# Patient Record
Sex: FEMALE | Race: WHITE | NOT HISPANIC OR LATINO | URBAN - METROPOLITAN AREA
[De-identification: names, ages, dates, MRNs, and addresses within clinical notes are randomized per-mention and may not be internally consistent; named-entity substitution may affect disease eponyms.]

---

## 2023-12-06 ENCOUNTER — OFFICE VISIT (OUTPATIENT)
Dept: FAMILY MEDICINE | Facility: CLINIC | Age: 33
End: 2023-12-06
Payer: MEDICAID

## 2023-12-06 VITALS
DIASTOLIC BLOOD PRESSURE: 73 MMHG | TEMPERATURE: 97 F | HEART RATE: 73 BPM | SYSTOLIC BLOOD PRESSURE: 106 MMHG | RESPIRATION RATE: 20 BRPM | BODY MASS INDEX: 18.47 KG/M2 | HEIGHT: 64 IN | OXYGEN SATURATION: 97 % | WEIGHT: 108.2 LBS

## 2023-12-06 DIAGNOSIS — Z02.89 REFUGEE HEALTH EXAMINATION: Primary | ICD-10-CM

## 2023-12-06 LAB
ALBUMIN SERPL BCG-MCNC: 4.7 G/DL (ref 3.5–5.2)
ALP SERPL-CCNC: 71 U/L (ref 40–150)
ALT SERPL W P-5'-P-CCNC: 16 U/L (ref 0–50)
ANION GAP SERPL CALCULATED.3IONS-SCNC: 11 MMOL/L (ref 7–15)
AST SERPL W P-5'-P-CCNC: 22 U/L (ref 0–45)
BASOPHILS # BLD AUTO: 0.1 10E3/UL (ref 0–0.2)
BASOPHILS NFR BLD AUTO: 1 %
BILIRUB SERPL-MCNC: 0.5 MG/DL
BUN SERPL-MCNC: 15.3 MG/DL (ref 6–20)
CALCIUM SERPL-MCNC: 10 MG/DL (ref 8.6–10)
CHLORIDE SERPL-SCNC: 105 MMOL/L (ref 98–107)
CREAT SERPL-MCNC: 0.71 MG/DL (ref 0.51–0.95)
DEPRECATED HCO3 PLAS-SCNC: 25 MMOL/L (ref 22–29)
EGFRCR SERPLBLD CKD-EPI 2021: >90 ML/MIN/1.73M2
EOSINOPHIL # BLD AUTO: 0.1 10E3/UL (ref 0–0.7)
EOSINOPHIL NFR BLD AUTO: 2 %
ERYTHROCYTE [DISTWIDTH] IN BLOOD BY AUTOMATED COUNT: 14 % (ref 10–15)
GLUCOSE SERPL-MCNC: 86 MG/DL (ref 70–99)
HAV IGG SER QL IA: NONREACTIVE
HBV CORE AB SERPL QL IA: NONREACTIVE
HBV SURFACE AB SERPL IA-ACNC: 0.25 M[IU]/ML
HBV SURFACE AB SERPL IA-ACNC: NONREACTIVE M[IU]/ML
HBV SURFACE AG SERPL QL IA: NONREACTIVE
HCT VFR BLD AUTO: 41.3 % (ref 35–47)
HCV AB SERPL QL IA: NONREACTIVE
HGB BLD-MCNC: 13.1 G/DL (ref 11.7–15.7)
HIV 1+2 AB+HIV1 P24 AG SERPL QL IA: NONREACTIVE
IMM GRANULOCYTES # BLD: 0 10E3/UL
IMM GRANULOCYTES NFR BLD: 0 %
LYMPHOCYTES # BLD AUTO: 2.4 10E3/UL (ref 0.8–5.3)
LYMPHOCYTES NFR BLD AUTO: 48 %
MCH RBC QN AUTO: 28.2 PG (ref 26.5–33)
MCHC RBC AUTO-ENTMCNC: 31.7 G/DL (ref 31.5–36.5)
MCV RBC AUTO: 89 FL (ref 78–100)
MONOCYTES # BLD AUTO: 0.3 10E3/UL (ref 0–1.3)
MONOCYTES NFR BLD AUTO: 6 %
NEUTROPHILS # BLD AUTO: 2.1 10E3/UL (ref 1.6–8.3)
NEUTROPHILS NFR BLD AUTO: 43 %
NRBC # BLD AUTO: 0 10E3/UL
NRBC BLD AUTO-RTO: 0 /100
PLATELET # BLD AUTO: 235 10E3/UL (ref 150–450)
POTASSIUM SERPL-SCNC: 4.3 MMOL/L (ref 3.4–5.3)
PROT SERPL-MCNC: 7.7 G/DL (ref 6.4–8.3)
RBC # BLD AUTO: 4.64 10E6/UL (ref 3.8–5.2)
SODIUM SERPL-SCNC: 141 MMOL/L (ref 135–145)
T PALLIDUM AB SER QL: NONREACTIVE
WBC # BLD AUTO: 5 10E3/UL (ref 4–11)

## 2023-12-06 PROCEDURE — 86803 HEPATITIS C AB TEST: CPT

## 2023-12-06 PROCEDURE — 80053 COMPREHEN METABOLIC PANEL: CPT

## 2023-12-06 PROCEDURE — 86787 VARICELLA-ZOSTER ANTIBODY: CPT

## 2023-12-06 PROCEDURE — 87389 HIV-1 AG W/HIV-1&-2 AB AG IA: CPT

## 2023-12-06 PROCEDURE — 36415 COLL VENOUS BLD VENIPUNCTURE: CPT

## 2023-12-06 PROCEDURE — 87340 HEPATITIS B SURFACE AG IA: CPT

## 2023-12-06 PROCEDURE — 86780 TREPONEMA PALLIDUM: CPT

## 2023-12-06 PROCEDURE — 85025 COMPLETE CBC W/AUTO DIFF WBC: CPT

## 2023-12-06 PROCEDURE — 86481 TB AG RESPONSE T-CELL SUSP: CPT

## 2023-12-06 PROCEDURE — 99000 SPECIMEN HANDLING OFFICE-LAB: CPT

## 2023-12-06 PROCEDURE — 86708 HEPATITIS A ANTIBODY: CPT

## 2023-12-06 PROCEDURE — 99385 PREV VISIT NEW AGE 18-39: CPT | Mod: GC

## 2023-12-06 PROCEDURE — 86682 HELMINTH ANTIBODY: CPT | Mod: 90

## 2023-12-06 PROCEDURE — 86706 HEP B SURFACE ANTIBODY: CPT

## 2023-12-06 PROCEDURE — 86704 HEP B CORE ANTIBODY TOTAL: CPT

## 2023-12-06 RX ORDER — PRENATAL VIT/IRON FUM/FOLIC AC 27MG-0.8MG
1 TABLET ORAL DAILY
Qty: 90 TABLET | Refills: 3 | Status: SHIPPED | OUTPATIENT
Start: 2023-12-06 | End: 2023-12-28

## 2023-12-06 NOTE — PROGRESS NOTES
Preceptor Attestation:    I discussed the patient with the resident and evaluated the patient in person. I have verified the content of the note, which accurately reflects my assessment of the patient and the plan of care.   Supervising Physician:  Genevieve Benitez MD

## 2023-12-06 NOTE — LETTER
December 20, 2023      Lisa Cobb  [address]  [place] [address]        Dear ,    We are writing to inform you of your test results.    Adore Gonzalez,     I hope you're well. I wanted to communicate with you the results of the tests that we did.     The laboratory results show you tested negative for parasites. Schistosoma was positive so we will need to repeat that test as we discussed in clinic. Please let me know if you have any questions or concerns.     Thank you.     Trudi Christopher,  PGY3     Resulted Orders   Routine parasitology exam   Result Value Ref Range    OVA AND PARASITE EXAM Negative Negative      Comment:      A single negative specimen does not rule out parasitic infection.    Narrative    Cryptosporidium, Cyclospora and Microsporidia are not readily detected by this method.   Hepatitis B Surface Ag   Result Value Ref Range    Hepatitis B Surface Antigen Nonreactive Nonreactive   Yoana Zoster Imm Status Елена   Result Value Ref Range    VZV Елена IgG Instrument Value 40.2 <135.0 Index    Varicella Zoster Antibody IgG No detectable antibody.    Hepatitis C Antibody   Result Value Ref Range    Hepatitis C Antibody Nonreactive Nonreactive    Narrative    Assay performance characteristics have not been established for newborns, infants, and children.   Hepatitis B Surface Ab   Result Value Ref Range    Hepatitis B Surface Antibody Instrument Value 0.25 <8.00 m[IU]/mL    Hepatitis B Surface Antibody Nonreactive       Comment:      No antibody detected when the value is less than 8.00 mIU/mL.   Hepatitis B Core Ab   Result Value Ref Range    Hepatitis B Core Antibody Total Nonreactive Nonreactive   Hepatitis A Immune Status   Result Value Ref Range    Hepatitis A Antibody IgG Nonreactive Nonreactive    Narrative    This assay cannot be used for the diagnosis of acute HAV infection.   HIV Ag/Ab Screen Cascade   Result Value Ref Range    HIV Antigen Antibody Combo Nonreactive Nonreactive       Comment:      HIV-1 p24 Ag & HIV-1/HIV-2 Ab Not Detected   Schistosoma Елена   Result Value Ref Range    Schistosoma Antibody IgG 10 (H) <=8 U      Comment:        Equivocal - Recommend repeat testing in 2-4 weeks with   fresh sample.  Performed By: [facility]  [address] [place], [place] [id number]  : Zechariah Cevallos MD, PhD  CLIA Number: [id number]   Strongyloides Ab,IgG (STRNG)   Result Value Ref Range    Strongyloides Елена IgG 0.6 <=0.9 IV      Comment:      INTERPRETIVE INFORMATION: Strongyloides Ab, IgG by ALEXI      0.9 IV or less....... Negative - No significant                          level of Strongyloides IgG                          antibody detected.       1.0 IV................Equivocal - The Strongyloides IgG                           antibody result is borderline and                           therefore inconclusive. Recommend                           retesting the patient in 2-4 weeks,                          if clinically indicated.      1.1 IV or greater ... Positive - IgG antibodies to                          Strongyloides detected, which                          may suggest current or past                          infection.    False-positive results may occur with prior exposure to   other helminth infections. Testing low-prevalence   populations may also result in false-positive results.  Performed By: [facility]  [address] [place] [id number]  : Zechariah Cevallos MD, PhD  CLIA  Number: [id number]   Syphilis Screen Cascade   Result Value Ref Range    Treponema Antibody Total Nonreactive Nonreactive   Comprehensive Metabolic   Result Value Ref Range    Sodium 141 135 - 145 mmol/L      Comment:      Reference intervals for this test were updated on 09/26/2023 to more accurately reflect our healthy population. There may be differences in the flagging of prior results with similar values performed with this method.  Interpretation of those prior results can be made in the context of the updated reference intervals.     Potassium 4.3 3.4 - 5.3 mmol/L    Carbon Dioxide (CO2) 25 22 - 29 mmol/L    Anion Gap 11 7 - 15 mmol/L    Urea Nitrogen 15.3 6.0 - 20.0 mg/dL    Creatinine 0.71 0.51 - 0.95 mg/dL    GFR Estimate >90 >60 mL/min/1.73m2    Calcium 10.0 8.6 - 10.0 mg/dL    Chloride 105 98 - 107 mmol/L    Glucose 86 70 - 99 mg/dL    Alkaline Phosphatase 71 40 - 150 U/L      Comment:      Reference intervals for this test were updated on 11/14/2023 to more accurately reflect our healthy population. There may be differences in the flagging of prior results with similar values performed with this method. Interpretation of those prior results can be made in the context of the updated reference intervals.    AST 22 0 - 45 U/L      Comment:      Reference intervals for this test were updated on 6/12/2023 to more accurately reflect our healthy population. There may be differences in the flagging of prior results with similar values performed with this method. Interpretation of those prior results can be made in the context of the updated reference intervals.    ALT 16 0 - 50 U/L      Comment:      Reference intervals for this test were updated on 6/12/2023 to more accurately reflect our healthy population. There may be differences in the flagging of prior results with similar values performed with this method. Interpretation of those prior results can be made in the context of the updated reference intervals.      Protein Total 7.7 6.4 - 8.3 g/dL    Albumin 4.7 3.5 - 5.2 g/dL    Bilirubin Total 0.5 <=1.2 mg/dL   CBC with platelets and differential   Result Value Ref Range    WBC Count 5.0 4.0 - 11.0 10e3/uL    RBC Count 4.64 3.80 - 5.20 10e6/uL    Hemoglobin 13.1 11.7 - 15.7 g/dL    Hematocrit 41.3 35.0 - 47.0 %    MCV 89 78 - 100 fL    MCH 28.2 26.5 - 33.0 pg    MCHC 31.7 31.5 - 36.5 g/dL    RDW 14.0 10.0 - 15.0 %    Platelet Count 235 150  - 450 10e3/uL    % Neutrophils 43 %    % Lymphocytes 48 %    % Monocytes 6 %    % Eosinophils 2 %    % Basophils 1 %    % Immature Granulocytes 0 %    NRBCs per 100 WBC 0 <1 /100    Absolute Neutrophils 2.1 1.6 - 8.3 10e3/uL    Absolute Lymphocytes 2.4 0.8 - 5.3 10e3/uL    Absolute Monocytes 0.3 0.0 - 1.3 10e3/uL    Absolute Eosinophils 0.1 0.0 - 0.7 10e3/uL    Absolute Basophils 0.1 0.0 - 0.2 10e3/uL    Absolute Immature Granulocytes 0.0 <=0.4 10e3/uL    Absolute NRBCs 0.0 10e3/uL   Quantiferon TB Gold Plus Grey Tube   Result Value Ref Range    Quantiferon Nil Tube 0.05 IU/mL   Quantiferon TB Gold Plus Green Tube   Result Value Ref Range    Quantiferon TB1 Tube 0.07 IU/mL   Quantiferon TB Gold Plus Yellow Tube   Result Value Ref Range    Quantiferon TB2 Tube 0.05    Quantiferon TB Gold Plus Purple Tube   Result Value Ref Range    Quantiferon Mitogen 10.00 IU/mL   Quantiferon TB Gold Plus   Result Value Ref Range    Quantiferon-TB Gold Plus Negative Negative      Comment:      No interferon gamma response to M.tuberculosis antigens was detected. Infection with M.tuberculosis is unlikely, however a single negative result does not exclude infection. In patients at high risk for infection, a second test should be considered in accordance with the 2017 ATS/IDSA/CDC Clinical Pract  ice Guidelines for Diagnosis of Tuberculosis in Adults and Children     TB1 Ag minus Nil Value 0.02 IU/mL    TB2 Ag minus Nil Value 0.00 IU/mL    Mitogen minus Nil Result 9.95 IU/mL    Nil Result 0.05 IU/mL       If you have any questions or concerns, please call the clinic at the number listed above.       Sincerely,      Genevieve Benitez MD

## 2023-12-06 NOTE — PROGRESS NOTES
Initial Refugee Screening Exam    PC staff should enter immunizations into the chart no records, unable to access [facility]     used this visit: A Danish  was used for this visit.     HEALTH HISTORY    Concerns today: no    [place]  [facility]   Year left country of Origin: 2023   Other countries lived in and dates: in [place] 2 months prior to coming to [place]  Date of Arrival in [place]: sept 29, 2023   Is our listed age correct? Yes  Native Language: Danish or Yemeni   Family in [place]: No    Pre-Departure Medical Screening Examination Reviewed:No  Class A conditions: No  Class B conditions: No  Presumptive treatment for intestinal parasites?: No  History of BCG vaccination: Yes  Chronic or serious illness: No  Hospitalizations: No  Trauma:No     Family history, medication list, problem list and allergies were reviewed and updated as needed in Epic.    ROS:    C: NEGATIVE for fever, chills, change in weight  I: NEGATIVE for worrisome rashes, moles or lesions, spots without sensations (e.g. leprosy)  E: NEGATIVE for vision changes or irritation or red eyes  E/M: NEGATIVE for ear, mouth and throat problems  R: NEGATIVE for significant cough or SOB  CV: NEGATIVE for chest pain, palpitations or peripheral edema  GI: NEGATIVE for nausea, abdominal pain, heartburn, or change in bowel habits  : NEGATIVE for frequency, dysuria, or hematuria  M: NEGATIVE for significant arthralgias or myalgia  N: NEGATIVE for weakness, dizziness or paresthesias, headaches  E: NEGATIVE for temperature intolerance, skin/hair changes  H: NEGATIVE for bleeding problems  P: NEGATIVE for nightmares, no sleep problems, not easily saddened or angered    Mental Health:    1. In the past month, have you felt too sad? No  2. In the past month, have you been worrying or thinking too much? Yes - worries about kid going to school   3. In the past month, have thoughts about the past that kept you from doing things or  spending time  with others? No  4. In the past month, did you have sleep problems? No  5. In the past month, did you have memory problems? No    If any of the above answers were yes, then ask:  6. Did any of the above stop you from doing things you need to do every day? No    Offered behavioral health referral: Yes, declines today       EXAMINATION:  There were no vitals taken for this visit.  GENERAL: healthy, alert, well nourished, well hydrated, no distress  HENT: ear canals- normal; TMs- normal; Nose- normal; Mouth- no ulcers, no lesions  NECK: no tenderness, no adenopathy, no asymmetry, no masses, no stiffness; thyroid- normal to palpation  RESP: lungs clear to auscultation - no rales, no rhonchi, no wheezes  CV: regular rates and rhythm, normal S1 S2, no S3 or S4 and no murmur, no click or rub -  ABDOMEN: soft, no tenderness, no  hepatosplenomegaly, no masses, normal bowel sounds    ASSESSMENT:    New Arrival Health Screening     PLAN:    1) Labs:  O&P  CMP  Lipid if age >18  CBC with diff  B12 if from [facility] or clinical suspicion  TB Quant if age 2 or older  RPR  Strongyloides Ab  Schistosoma Ab  HIV  Heb B Core Ab  Hep B Surface Ab  Hep B Surface Ag  Hep C Ab  Hep A Ab  O & P, direct smears x 2 concentration and ID  Varicella titer    2) TB:  TB Quant    3) Immunizations to be applied at second visit.    4) preventative   -prenatal vitamins         RTC in 2-4 weeks for discussion of results, treatment (if necessary) and  devlopment of an ongoing plan for care    Trudi Christopher DO, PGY-3  [facility]    Today I precepted with Dr. Emmanuel MD, who agrees with the assessment and plan.

## 2023-12-06 NOTE — PATIENT INSTRUCTIONS
Thank you for trusting us with your care.     Here's a summary of the visit today:   You will have labs drawn   Prenatal vitamins sent to pharmacy   Follow up on 12/13 to discuss results            Thank you.     Dr. Christopher

## 2023-12-07 LAB
VZV IGG SER QL IA: 40.2 INDEX
VZV IGG SER QL IA: NORMAL

## 2023-12-08 LAB
GAMMA INTERFERON BACKGROUND BLD IA-ACNC: 0.05 IU/ML
M TB IFN-G BLD-IMP: NEGATIVE
M TB IFN-G CD4+ BCKGRND COR BLD-ACNC: 9.95 IU/ML
MITOGEN IGNF BCKGRD COR BLD-ACNC: 0 IU/ML
MITOGEN IGNF BCKGRD COR BLD-ACNC: 0.02 IU/ML
QUANTIFERON MITOGEN: 10 IU/ML
QUANTIFERON NIL TUBE: 0.05 IU/ML
QUANTIFERON TB1 TUBE: 0.07 IU/ML
QUANTIFERON TB2 TUBE: 0.05

## 2023-12-09 LAB — SCHISTOSOMA IGG SER IA-ACNC: 10 U

## 2023-12-10 LAB — STRONGYLOIDES IGG SER IA-ACNC: 0.6 IV

## 2023-12-12 ENCOUNTER — CARE COORDINATION (OUTPATIENT)
Dept: FAMILY MEDICINE | Facility: CLINIC | Age: 33
End: 2023-12-12
Payer: MEDICAID

## 2023-12-12 PROCEDURE — 87209 SMEAR COMPLEX STAIN: CPT

## 2023-12-12 PROCEDURE — 87177 OVA AND PARASITES SMEARS: CPT

## 2023-12-12 NOTE — PROGRESS NOTES
Medical Transportation Coordination    Appt Date: 12/13/2023               Arrival Time: 1:10pm    Appt Location & Address:   [address] [place] [address]    Total family members:   4    Tranportation Name & Phone Number:   Margie [phone]    Estimated Pick-up Time:   12:00-12:15pm    Roundtrip?  Yes  Patient Notified?  No - will call w/ today to confirm    Ailyn Arana on 12/12/2023 at 8:58 AM

## 2023-12-13 ENCOUNTER — OFFICE VISIT (OUTPATIENT)
Dept: FAMILY MEDICINE | Facility: CLINIC | Age: 33
End: 2023-12-13
Payer: MEDICAID

## 2023-12-13 VITALS
OXYGEN SATURATION: 98 % | DIASTOLIC BLOOD PRESSURE: 65 MMHG | RESPIRATION RATE: 16 BRPM | HEART RATE: 65 BPM | SYSTOLIC BLOOD PRESSURE: 100 MMHG | TEMPERATURE: 97.6 F

## 2023-12-13 DIAGNOSIS — Z02.89 REFUGEE HEALTH EXAMINATION: Primary | ICD-10-CM

## 2023-12-13 PROCEDURE — 99214 OFFICE O/P EST MOD 30 MIN: CPT | Mod: GC

## 2023-12-13 NOTE — PATIENT INSTRUCTIONS
Health Maintenance Due   Topic Date Due   • Shingles Vaccine (2 of 2) 11/05/2018   • Colonoscopy Risk  12/07/2020   • COVID-19 Vaccine (3 - Moderna risk 3-dose series) 05/28/2021       Patient is due for topics as listed above but is not proceeding with Immunization(s) COVID-19 and Colorectal Cancer Screening: Colonoscopy at this time. Patient is following pediatric unified immunization schedule for immunizations.Patient completed Second Shingrix Vaccine will update once confirmed.           Thank you for trusting us with your care.     Here's a summary of the visit today:   You received extensive family planning counseling, pamphlet for contraceptive methods    Recheck schistosoma blood test at next visit   Follow up in 4-6 weeks for annual female exam            Thank you.     Dr. Christopher

## 2023-12-13 NOTE — PROGRESS NOTES
REFUGEE SCREENING: SECOND VISIT    Subjective: no concerns     Labs from Initial Refugee Screening Visit were reviewed       Office Visit on 12/06/2023   Component Date Value Ref Range Status    Hepatitis B Surface Antigen 12/06/2023 Nonreactive  Nonreactive Final    VZV Елена IgG Instrument Value 12/06/2023 40.2  <135.0 Index Final    Varicella Zoster Antibody IgG 12/06/2023 No detectable antibody.   Final    Hepatitis C Antibody 12/06/2023 Nonreactive  Nonreactive Final    Hepatitis B Surface Antibody Instr* 12/06/2023 0.25  <8.00 m[IU]/mL Final    Hepatitis B Surface Antibody 12/06/2023 Nonreactive   Final    No antibody detected when the value is less than 8.00 mIU/mL.    Hepatitis B Core Antibody Total 12/06/2023 Nonreactive  Nonreactive Final    Hepatitis A Antibody IgG 12/06/2023 Nonreactive  Nonreactive Final    HIV Antigen Antibody Combo 12/06/2023 Nonreactive  Nonreactive Final    HIV-1 p24 Ag & HIV-1/HIV-2 Ab Not Detected    Schistosoma Antibody IgG 12/06/2023 10 (H)  <=8 U Final      Equivocal - Recommend repeat testing in 2-4 weeks with   fresh sample.  Performed By: [facility]  [address] [place] [id number]  : Zechariah Cevallos MD, PhD  CLIA Number: [id number]    Strongyloides Елена IgG 12/06/2023 0.6  <=0.9 IV Final    Comment: INTERPRETIVE INFORMATION: Strongyloides Ab, IgG by ALEXI      0.9 IV or less....... Negative - No significant                          level of Strongyloides IgG                          antibody detected.       1.0 IV................Equivocal - The Strongyloides IgG                           antibody result is borderline and                           therefore inconclusive. Recommend                           retesting the patient in 2-4 weeks,                          if clinically indicated.      1.1 IV or greater ... Positive - IgG antibodies to                          Strongyloides detected, which                          may suggest  current or past                          infection.    False-positive results may occur with prior exposure to   other helminth infections. Testing low-prevalence   populations may also result in false-positive results.  Performed By: [facility]  [address] [place] [id number]  : Zechariah Cevallos MD, PhD  CLIA                            Number: [id number]    Treponema Antibody Total 12/06/2023 Nonreactive  Nonreactive Final    Sodium 12/06/2023 141  135 - 145 mmol/L Final    Reference intervals for this test were updated on 09/26/2023 to more accurately reflect our healthy population. There may be differences in the flagging of prior results with similar values performed with this method. Interpretation of those prior results can be made in the context of the updated reference intervals.     Potassium 12/06/2023 4.3  3.4 - 5.3 mmol/L Final    Carbon Dioxide (CO2) 12/06/2023 25  22 - 29 mmol/L Final    Anion Gap 12/06/2023 11  7 - 15 mmol/L Final    Urea Nitrogen 12/06/2023 15.3  6.0 - 20.0 mg/dL Final    Creatinine 12/06/2023 0.71  0.51 - 0.95 mg/dL Final    GFR Estimate 12/06/2023 >90  >60 mL/min/1.73m2 Final    Calcium 12/06/2023 10.0  8.6 - 10.0 mg/dL Final    Chloride 12/06/2023 105  98 - 107 mmol/L Final    Glucose 12/06/2023 86  70 - 99 mg/dL Final    Alkaline Phosphatase 12/06/2023 71  40 - 150 U/L Final    Reference intervals for this test were updated on 11/14/2023 to more accurately reflect our healthy population. There may be differences in the flagging of prior results with similar values performed with this method. Interpretation of those prior results can be made in the context of the updated reference intervals.    AST 12/06/2023 22  0 - 45 U/L Final    Reference intervals for this test were updated on 6/12/2023 to more accurately reflect our healthy population. There may be differences in the flagging of prior results with similar values performed with this  method. Interpretation of those prior results can be made in the context of the updated reference intervals.    ALT 12/06/2023 16  0 - 50 U/L Final    Reference intervals for this test were updated on 6/12/2023 to more accurately reflect our healthy population. There may be differences in the flagging of prior results with similar values performed with this method. Interpretation of those prior results can be made in the context of the updated reference intervals.      Protein Total 12/06/2023 7.7  6.4 - 8.3 g/dL Final    Albumin 12/06/2023 4.7  3.5 - 5.2 g/dL Final    Bilirubin Total 12/06/2023 0.5  <=1.2 mg/dL Final    WBC Count 12/06/2023 5.0  4.0 - 11.0 10e3/uL Final    RBC Count 12/06/2023 4.64  3.80 - 5.20 10e6/uL Final    Hemoglobin 12/06/2023 13.1  11.7 - 15.7 g/dL Final    Hematocrit 12/06/2023 41.3  35.0 - 47.0 % Final    MCV 12/06/2023 89  78 - 100 fL Final    MCH 12/06/2023 28.2  26.5 - 33.0 pg Final    MCHC 12/06/2023 31.7  31.5 - 36.5 g/dL Final    RDW 12/06/2023 14.0  10.0 - 15.0 % Final    Platelet Count 12/06/2023 235  150 - 450 10e3/uL Final    % Neutrophils 12/06/2023 43  % Final    % Lymphocytes 12/06/2023 48  % Final    % Monocytes 12/06/2023 6  % Final    % Eosinophils 12/06/2023 2  % Final    % Basophils 12/06/2023 1  % Final    % Immature Granulocytes 12/06/2023 0  % Final    NRBCs per 100 WBC 12/06/2023 0  <1 /100 Final    Absolute Neutrophils 12/06/2023 2.1  1.6 - 8.3 10e3/uL Final    Absolute Lymphocytes 12/06/2023 2.4  0.8 - 5.3 10e3/uL Final    Absolute Monocytes 12/06/2023 0.3  0.0 - 1.3 10e3/uL Final    Absolute Eosinophils 12/06/2023 0.1  0.0 - 0.7 10e3/uL Final    Absolute Basophils 12/06/2023 0.1  0.0 - 0.2 10e3/uL Final    Absolute Immature Granulocytes 12/06/2023 0.0  <=0.4 10e3/uL Final    Absolute NRBCs 12/06/2023 0.0  10e3/uL Final    Quantiferon Nil Tube 12/06/2023 0.05  IU/mL Final    Quantiferon TB1 Tube 12/06/2023 0.07  IU/mL Final    Quantiferon TB2 Tube 12/06/2023 0.05    Final    Quantiferon Mitogen 12/06/2023 10.00  IU/mL Final    Quantiferon-TB Gold Plus 12/06/2023 Negative  Negative Final    No interferon gamma response to M.tuberculosis antigens was detected. Infection with M.tuberculosis is unlikely, however a single negative result does not exclude infection. In patients at high risk for infection, a second test should be considered in accordance with the 2017 ATS/IDSA/CDC Clinical Pract  ice Guidelines for Diagnosis of Tuberculosis in Adults and Children     TB1 Ag minus Nil Value 12/06/2023 0.02  IU/mL Final    TB2 Ag minus Nil Value 12/06/2023 0.00  IU/mL Final    Mitogen minus Nil Result 12/06/2023 9.95  IU/mL Final    Nil Result 12/06/2023 0.05  IU/mL Final        ROS:  General: No fevers, sleeping well at night  Head: No headache  Neck: No swallowing problems   CV: No chest pain or palpitations  Resp: No shortness of breath.  No cough.  GI: No constipation, or diarrhea, no nausea or vomiting  : No urinary c/o    Objective:  /65   Pulse 65   Temp 97.6  F (36.4  C)   Resp 16   SpO2 98%   Gen:  Well nourished and in NAD  HEENT: nasopharynx pink and moist; oropharynx pink and moist  Neck: supple without lymphadenopathy  CV:  RRR  - no murmurs, rubs, or gallups,   Pulm:  CTAB, no wheezes/rales/rhonchi, good air entry   ABD: soft, nontender  Extrem: no cyanosis, edema or clubbing;   Psych: Euthymic     Assessment/Plan:  There are no diagnoses linked to this encounter.    1)Abnormal lab results: schistosoma Abx , repeat in 2-4 weeks    2) Abnormal parasite results and treatment plant: schistosoma Abx , repeat in 2-4 weeks    3) TB: TB Quant result: neg     4) Immunizations:  TDaP  MMR  Hep B  Hep A  Influenza   Yearly  Varicella   Provided vaccine counseling, risks and benefits. Patient declines at this time.       Discussed contraception given   5)Referrals: No   Discussed contraception, various methods, provided patient education pamphlet. Patient is exclusively  breastfeeding, not using barrier methods to prevent pregnancy.   Discussed the risk of uterine rupture with subsequent pregnancy given history of C/S x 2 with most recent one resulting in uterine rupture. Patient given contraception pamphlet to review.   Consider referral to OBGYN, follow up hematoma imaging at next female physical.         6)Follow up plan: Return to clinic for women's health exam in 2-4 weeks.     We discussed having a visit with a dentist to establish regular dental care: resources provided   We discussed yearly visits with a primary care physician for preventative health care: yes      Trudi Christopher DO, PGY-3  [facility]    Today I precepted with Dr. Jan MD, who agrees with the assessment and plan.

## 2023-12-13 NOTE — PROGRESS NOTES
Preceptor Attestation:    I discussed the patient with the resident and evaluated the patient in person. I have verified the content of the note, which accurately reflects my assessment of the patient and the plan of care.   Supervising Physician:  Alexei Montoya DO.

## 2023-12-14 LAB — O+P STL MICRO: NEGATIVE

## 2023-12-28 DIAGNOSIS — Z02.89 REFUGEE HEALTH EXAMINATION: ICD-10-CM

## 2023-12-28 RX ORDER — PRENATAL VIT/IRON FUM/FOLIC AC 27MG-0.8MG
1 TABLET ORAL DAILY
Qty: 90 TABLET | Refills: 3 | Status: SHIPPED | OUTPATIENT
Start: 2023-12-28

## 2024-04-09 ENCOUNTER — OFFICE VISIT (OUTPATIENT)
Dept: URGENT CARE | Facility: URGENT CARE | Age: 34
End: 2024-04-09
Payer: COMMERCIAL

## 2024-04-09 VITALS
WEIGHT: 106 LBS | HEIGHT: 64 IN | TEMPERATURE: 98.3 F | DIASTOLIC BLOOD PRESSURE: 71 MMHG | BODY MASS INDEX: 18.1 KG/M2 | OXYGEN SATURATION: 100 % | SYSTOLIC BLOOD PRESSURE: 110 MMHG | HEART RATE: 80 BPM

## 2024-04-09 DIAGNOSIS — J34.89 SINUS DRAINAGE: Primary | ICD-10-CM

## 2024-04-09 DIAGNOSIS — R51.9 FRONTAL HEADACHE: ICD-10-CM

## 2024-04-09 LAB
BASOPHILS # BLD AUTO: 0 10E3/UL (ref 0–0.2)
BASOPHILS NFR BLD AUTO: 1 %
EOSINOPHIL # BLD AUTO: 0 10E3/UL (ref 0–0.7)
EOSINOPHIL NFR BLD AUTO: 1 %
ERYTHROCYTE [DISTWIDTH] IN BLOOD BY AUTOMATED COUNT: 14.1 % (ref 10–15)
HCT VFR BLD AUTO: 38.2 % (ref 35–47)
HGB BLD-MCNC: 12.3 G/DL (ref 11.7–15.7)
IMM GRANULOCYTES # BLD: 0 10E3/UL
IMM GRANULOCYTES NFR BLD: 0 %
LYMPHOCYTES # BLD AUTO: 1.9 10E3/UL (ref 0.8–5.3)
LYMPHOCYTES NFR BLD AUTO: 38 %
MCH RBC QN AUTO: 27.2 PG (ref 26.5–33)
MCHC RBC AUTO-ENTMCNC: 32.2 G/DL (ref 31.5–36.5)
MCV RBC AUTO: 85 FL (ref 78–100)
MONOCYTES # BLD AUTO: 0.2 10E3/UL (ref 0–1.3)
MONOCYTES NFR BLD AUTO: 4 %
NEUTROPHILS # BLD AUTO: 2.9 10E3/UL (ref 1.6–8.3)
NEUTROPHILS NFR BLD AUTO: 56 %
PLATELET # BLD AUTO: 337 10E3/UL (ref 150–450)
RBC # BLD AUTO: 4.52 10E6/UL (ref 3.8–5.2)
WBC # BLD AUTO: 5.1 10E3/UL (ref 4–11)

## 2024-04-09 PROCEDURE — 99204 OFFICE O/P NEW MOD 45 MIN: CPT | Performed by: FAMILY MEDICINE

## 2024-04-09 PROCEDURE — 36415 COLL VENOUS BLD VENIPUNCTURE: CPT | Performed by: FAMILY MEDICINE

## 2024-04-09 PROCEDURE — 85025 COMPLETE CBC W/AUTO DIFF WBC: CPT | Performed by: FAMILY MEDICINE

## 2024-04-09 NOTE — PATIENT INSTRUCTIONS
Saline nasal spray   Continue on antihistamines such as zyrtec or claritin   Tylenol or ibuprofen for pain   Follow up if  symptoms fail to improve or worsens   Pt understood and agreed with plan

## 2024-04-09 NOTE — PROGRESS NOTES
Chief Complaint   Patient presents with    Nasal Congestion     X2 weeks of congestion  Yellow drainage from nose, unlike mucus coming out of nose before     Headache     X2 weeks of headache     Urgent Care         Lisa was seen today for nasal congestion, headache and urgent care.    Diagnoses and all orders for this visit:    Sinus drainage  -     CBC with platelets and differential; Future  -     CBC with platelets and differential    Frontal headache      Results for orders placed or performed in visit on 04/09/24   CBC with platelets and differential     Status: None   Result Value Ref Range    WBC Count 5.1 4.0 - 11.0 10e3/uL    RBC Count 4.52 3.80 - 5.20 10e6/uL    Hemoglobin 12.3 11.7 - 15.7 g/dL    Hematocrit 38.2 35.0 - 47.0 %    MCV 85 78 - 100 fL    MCH 27.2 26.5 - 33.0 pg    MCHC 32.2 31.5 - 36.5 g/dL    RDW 14.1 10.0 - 15.0 %    Platelet Count 337 150 - 450 10e3/uL    % Neutrophils 56 %    % Lymphocytes 38 %    % Monocytes 4 %    % Eosinophils 1 %    % Basophils 1 %    % Immature Granulocytes 0 %    Absolute Neutrophils 2.9 1.6 - 8.3 10e3/uL    Absolute Lymphocytes 1.9 0.8 - 5.3 10e3/uL    Absolute Monocytes 0.2 0.0 - 1.3 10e3/uL    Absolute Eosinophils 0.0 0.0 - 0.7 10e3/uL    Absolute Basophils 0.0 0.0 - 0.2 10e3/uL    Absolute Immature Granulocytes 0.0 <=0.4 10e3/uL   CBC with platelets and differential     Status: None    Narrative    The following orders were created for panel order CBC with platelets and differential.  Procedure                               Abnormality         Status                     ---------                               -----------         ------                     CBC with platelets and d...[[id number]]                      Final result                 Please view results for these tests on the individual orders.         D/d  Bronchitis-viral, Laryngitis, Pneumonia, Sinusitis, Tonsilitis, Viral pharyngitis, Viral syndrome, Viral upper respiratory illness, CSF leak      PLAN:  Symptomatic therapy suggested: push fluids, rest, gargle warm salt water, use vaporizer or mist needed , use acetaminophen, ibuprofen as needed, and apply heat to sinuses as needed. Call or return to clinic prn if these symptoms worsen or fail to improve as anticipated.  As exam was completely normal discussed with patient about the possibilities of the symptoms.  As she was feeling better on the yellow drainage just happen for a day and she is overall feeling better there is no need for an antibiotic at this point.  CBC was completely normal discussed with patient as there is no fever no chills no worsening headache would continue doing symptomatic treatment.  Spent more than 35 minutes discussing with patient and with family member with the help of an  explaining the condition and that there is no need for an antibiotic also reviewed with patient if there is any worsening symptoms with worsening drainage with high fever should follow-up.    SUBJECTIVE:   Lisa Cobb is a 33 year old female who complains of recent uri for 14 days. She denies a history of nasal congestion and also sore throat and  and denies a history of asthma..she denies any acute sob or cough but been noticing some frontal headache which she describes as being there on and off.  Also noticed some yellow-colored thin drainage from her nose which happened yesterday which was not mucous the patient is concerned about the drainage.  She has been feeling way better from the URI symptoms now worse daughter was also sick but now recovered.  Needed Israeli  during the entire visit.     OBJECTIVE:  She appears well, vital signs are as noted by the nurse. Ears normal.  Throat and pharynx normal.  Neck supple. No adenopathy in the neck. Nose is congested. Sinuses non tender. The chest is clear, without wheezes or rales.    Ana M Brito MD

## 2024-07-31 ENCOUNTER — OFFICE VISIT (OUTPATIENT)
Dept: FAMILY MEDICINE | Facility: CLINIC | Age: 34
End: 2024-07-31
Payer: COMMERCIAL

## 2024-07-31 VITALS
HEART RATE: 66 BPM | HEIGHT: 63 IN | OXYGEN SATURATION: 98 % | TEMPERATURE: 97.9 F | DIASTOLIC BLOOD PRESSURE: 65 MMHG | WEIGHT: 102.6 LBS | BODY MASS INDEX: 18.18 KG/M2 | SYSTOLIC BLOOD PRESSURE: 99 MMHG | RESPIRATION RATE: 18 BRPM

## 2024-07-31 DIAGNOSIS — Z11.8 SCREENING EXAMINATION FOR PARASITIC INFECTION: ICD-10-CM

## 2024-07-31 DIAGNOSIS — G89.29 CHRONIC UPPER BACK PAIN: ICD-10-CM

## 2024-07-31 DIAGNOSIS — Z13.1 SCREENING FOR DIABETES MELLITUS: ICD-10-CM

## 2024-07-31 DIAGNOSIS — Z13.29 SCREENING FOR THYROID DISORDER: ICD-10-CM

## 2024-07-31 DIAGNOSIS — Z00.00 ROUTINE GENERAL MEDICAL EXAMINATION AT A HEALTH CARE FACILITY: Primary | ICD-10-CM

## 2024-07-31 DIAGNOSIS — R23.3 PETECHIAE: ICD-10-CM

## 2024-07-31 DIAGNOSIS — Z12.4 CERVICAL CANCER SCREENING: ICD-10-CM

## 2024-07-31 DIAGNOSIS — M54.9 CHRONIC UPPER BACK PAIN: ICD-10-CM

## 2024-07-31 DIAGNOSIS — E55.9 VITAMIN D DEFICIENCY: ICD-10-CM

## 2024-07-31 DIAGNOSIS — Z13.220 LIPID SCREENING: ICD-10-CM

## 2024-07-31 LAB — ERYTHROCYTE [SEDIMENTATION RATE] IN BLOOD BY WESTERGREN METHOD: 7 MM/HR (ref 0–20)

## 2024-07-31 PROCEDURE — 80061 LIPID PANEL: CPT | Performed by: FAMILY MEDICINE

## 2024-07-31 PROCEDURE — 99214 OFFICE O/P EST MOD 30 MIN: CPT | Mod: 25 | Performed by: FAMILY MEDICINE

## 2024-07-31 PROCEDURE — 99000 SPECIMEN HANDLING OFFICE-LAB: CPT | Performed by: FAMILY MEDICINE

## 2024-07-31 PROCEDURE — 99395 PREV VISIT EST AGE 18-39: CPT | Performed by: FAMILY MEDICINE

## 2024-07-31 PROCEDURE — 86038 ANTINUCLEAR ANTIBODIES: CPT | Performed by: FAMILY MEDICINE

## 2024-07-31 PROCEDURE — 86036 ANCA SCREEN EACH ANTIBODY: CPT | Performed by: FAMILY MEDICINE

## 2024-07-31 PROCEDURE — 85652 RBC SED RATE AUTOMATED: CPT | Performed by: FAMILY MEDICINE

## 2024-07-31 PROCEDURE — 80053 COMPREHEN METABOLIC PANEL: CPT | Performed by: FAMILY MEDICINE

## 2024-07-31 PROCEDURE — 82306 VITAMIN D 25 HYDROXY: CPT | Performed by: FAMILY MEDICINE

## 2024-07-31 PROCEDURE — 36415 COLL VENOUS BLD VENIPUNCTURE: CPT | Performed by: FAMILY MEDICINE

## 2024-07-31 PROCEDURE — 84443 ASSAY THYROID STIM HORMONE: CPT | Performed by: FAMILY MEDICINE

## 2024-07-31 PROCEDURE — 86682 HELMINTH ANTIBODY: CPT | Mod: 90 | Performed by: FAMILY MEDICINE

## 2024-07-31 SDOH — HEALTH STABILITY: PHYSICAL HEALTH: ON AVERAGE, HOW MANY DAYS PER WEEK DO YOU ENGAGE IN MODERATE TO STRENUOUS EXERCISE (LIKE A BRISK WALK)?: 7 DAYS

## 2024-07-31 ASSESSMENT — SOCIAL DETERMINANTS OF HEALTH (SDOH): HOW OFTEN DO YOU GET TOGETHER WITH FRIENDS OR RELATIVES?: ONCE A WEEK

## 2024-07-31 NOTE — LETTER
August 7, 2024      Lisa Cobb  [address]  [place] [address]        Dear ,    We are writing to inform you of your test results.    Here are your lab results.   Kidney function, liver function, and electrolytes are normal.   No diabetes.   Thyroid function and vitamin D levels are normal.   Labs for acute vasculitis for your rash are negative.   Cholesterol levels are only mildly elevated.   If you still have a rash please make sure you schedule an appointment with the dermatologist. The number is on the paperwork that we gave you last visit.   If you have questions or concerns please schedule a follow-up visit.     Resulted Orders   Lipid panel reflex to direct LDL Non-fasting   Result Value Ref Range    Cholesterol 208 (H) <200 mg/dL    Triglycerides 42 <150 mg/dL    Direct Measure HDL 78 >=50 mg/dL    LDL Cholesterol Calculated 122 (H) <=100 mg/dL    Non HDL Cholesterol 130 (H) <130 mg/dL    Patient Fasting > 8hrs? Yes     Narrative    Cholesterol  Desirable:  <200 mg/dL    Triglycerides  Normal:  Less than 150 mg/dL  Borderline High:  150-199 mg/dL  High:  200-499 mg/dL  Very High:  Greater than or equal to 500 mg/dL    Direct Measure HDL  Female:  Greater than or equal to 50 mg/dL   Male:  Greater than or equal to 40 mg/dL    LDL Cholesterol  Desirable:  <100mg/dL  Above Desirable:  100-129 mg/dL   Borderline High:  130-159 mg/dL   High:  160-189 mg/dL   Very High:  >= 190 mg/dL    Non HDL Cholesterol  Desirable:  130 mg/dL  Above Desirable:  130-159 mg/dL  Borderline High:  160-189 mg/dL  High:  190-219 mg/dL  Very High:  Greater than or equal to 220 mg/dL   Schistosoma Antibody IgG   Result Value Ref Range    Schistosoma Antibody IgG 8 <=8 U      Comment:        Negative - No significant level of Schistosoma IgG antibody   detected.  Performed By: [facility]  [address] [place] [id number]  : Zechariah Cevallos MD, PhD  CLIA Number: [id number]    Comprehensive metabolic panel (BMP + Alb, Alk Phos, ALT, AST, Total. Bili, TP)   Result Value Ref Range    Sodium 139 135 - 145 mmol/L    Potassium 4.6 3.4 - 5.3 mmol/L    Carbon Dioxide (CO2) 25 22 - 29 mmol/L    Anion Gap 11 7 - 15 mmol/L    Urea Nitrogen 15.1 6.0 - 20.0 mg/dL    Creatinine 0.64 0.51 - 0.95 mg/dL    GFR Estimate >90 >60 mL/min/1.73m2      Comment:      eGFR calculated using 2021 CKD-EPI equation.    Calcium 9.7 8.8 - 10.4 mg/dL      Comment:      Reference intervals for this test were updated on 7/16/2024 to reflect our healthy population more accurately. There may be differences in the flagging of prior results with similar values performed with this method. Those prior results can be interpreted in the context of the updated reference intervals.    Chloride 103 98 - 107 mmol/L    Glucose 78 70 - 99 mg/dL    Alkaline Phosphatase 73 40 - 150 U/L    AST 19 0 - 45 U/L    ALT 8 0 - 50 U/L    Protein Total 7.8 6.4 - 8.3 g/dL    Albumin 4.9 3.5 - 5.2 g/dL    Bilirubin Total 0.8 <=1.2 mg/dL    Patient Fasting > 8hrs? Yes    Anti Nuclear Елена IgG by IFA with Reflex   Result Value Ref Range    VALERIE interpretation Negative Negative      Comment:        Negative:              <1:40  Borderline Positive:   1:40 - 1:80  Positive:              >1:80   ANCA IgG by IFA with Reflex to Titer   Result Value Ref Range    Neutrophil Cytoplasmic Antibody 1:10 <=1:10    Neutrophil Cytoplasmic Antibody Pattern       Cytoplasmic ANCA (c-ANCA) staining pattern observed and confirmed on formalin-fixed neutrophils.   ESR: Erythrocyte sedimentation rate   Result Value Ref Range    Erythrocyte Sedimentation Rate 7 0 - 20 mm/hr   TSH with free T4 reflex   Result Value Ref Range    TSH 1.90 0.30 - 4.20 uIU/mL   Vitamin D Deficiency   Result Value Ref Range    Vitamin D, Total (25-Hydroxy) 49 20 - 50 ng/mL      Comment:      optimum levels    Narrative    Season, race, dietary intake, and treatment affect the concentration of  25-hydroxy-Vitamin D. Values may decrease during winter months and increase during summer months.    Vitamin D determination is routinely performed by an immunoassay specific for 25 hydroxyvitamin D3.  If an individual is on vitamin D2(ergocalciferol) supplementation, please specify 25 [place] vitamin D2 and D3 level determination by LCMSMS test [id number].         If you have any questions or concerns, please call the clinic at the number listed above.       Sincerely,      Dhruv Burnett, DO

## 2024-07-31 NOTE — PATIENT INSTRUCTIONS
If you want to do labs, please call and schedule a lab visit.      Patient Education   Preventive Care Advice   This is general advice given by our system to help you stay healthy. However, your care team may have specific advice just for you. Please talk to your care team about your preventive care needs.  Nutrition  Eat 5 or more servings of fruits and vegetables each day.  Try wheat bread, brown rice and whole grain pasta (instead of white bread, rice, and pasta).  Get enough calcium and vitamin D. Check the label on foods and aim for 100% of the RDA (recommended daily allowance).  Lifestyle  Exercise at least 150 minutes each week  (30 minutes a day, 5 days a week).  Do muscle strengthening activities 2 days a week. These help control your weight and prevent disease.  No smoking.  Wear sunscreen to prevent skin cancer.  Have a dental exam and cleaning every 6 months.  Yearly exams  See your health care team every year to talk about:  Any changes in your health.  Any medicines your care team has prescribed.  Preventive care, family planning, and ways to prevent chronic diseases.  Shots (vaccines)   HPV shots (up to age 26), if you've never had them before.  Hepatitis B shots (up to age 59), if you've never had them before.  COVID-19 shot: Get this shot when it's due.  Flu shot: Get a flu shot every year.  Tetanus shot: Get a tetanus shot every 10 years.  Pneumococcal, hepatitis A, and RSV shots: Ask your care team if you need these based on your risk.  Shingles shot (for age 50 and up)  General health tests  Diabetes screening:  Starting at age 35, Get screened for diabetes at least every 3 years.  If you are younger than age 35, ask your care team if you should be screened for diabetes.  Cholesterol test: At age 39, start having a cholesterol test every 5 years, or more often if advised.  Bone density scan (DEXA): At age 50, ask your care team if you should have this scan for osteoporosis (brittle  bones).  Hepatitis C: Get tested at least once in your life.  STIs (sexually transmitted infections)  Before age 24: Ask your care team if you should be screened for STIs.  After age 24: Get screened for STIs if you're at risk. You are at risk for STIs (including HIV) if:  You are sexually active with more than one person.  You don't use condoms every time.  You or a partner was diagnosed with a sexually transmitted infection.  If you are at risk for HIV, ask about PrEP medicine to prevent HIV.  Get tested for HIV at least once in your life, whether you are at risk for HIV or not.  Cancer screening tests  Cervical cancer screening: If you have a cervix, begin getting regular cervical cancer screening tests starting at age 21.  Breast cancer scan (mammogram): If you've ever had breasts, begin having regular mammograms starting at age 40. This is a scan to check for breast cancer.  Colon cancer screening: It is important to start screening for colon cancer at age 45.  Have a colonoscopy test every 10 years (or more often if you're at risk) Or, ask your provider about stool tests like a FIT test every year or Cologuard test every 3 years.  To learn more about your testing options, visit:   .  For help making a decision, visit:   https://bit.ly/qg20778.  Prostate cancer screening test: If you have a prostate, ask your care team if a prostate cancer screening test (PSA) at age 55 is right for you.  Lung cancer screening: If you are a current or former smoker ages 50 to 80, ask your care team if ongoing lung cancer screenings are right for you.  For informational purposes only. Not to replace the advice of your health care provider. Copyright   2023 [employer]. All rights reserved. Clinically reviewed by the [facility] Transitions Program. [employer] [id number] - REV 01/24.

## 2024-07-31 NOTE — PROGRESS NOTES
"Preventive Care Visit  [facility]  Dhruv Burnett DO, Family Medicine  Jul 31, 2024      Assessment & Plan     Routine general medical examination at a health care facility    Lipid screening  - Lipid panel reflex to direct LDL Non-fasting    Screening for diabetes mellitus  - Comprehensive metabolic panel (BMP + Alb, Alk Phos, ALT, AST, Total. Bili, TP)    Screening examination for parasitic infection  -Was equivocal at during refugee exam 12/2023, needs repeating.  - Schistosoma Antibody IgG    Screening for thyroid disorder  - TSH with free T4 reflex    Cervical cancer screening  -Wishes to establish with gynecology   - Ob/Gyn  Referral    Vitamin D deficiency  - Vitamin D Deficiency    Petechiae  -Unclear cause, concerns for vasculitis or other autoimmune condition  -Rash started 2-3 months ago. No other symptoms. Recommended starting with labs below. Will also refer to derm for eval and possible biopsy.  - Comprehensive metabolic panel (BMP + Alb, Alk Phos, ALT, AST, Total. Bili, TP)  - Anti Nuclear Елена IgG by IFA with Reflex  - ANCA IgG by IFA with Reflex to Titer  - Adult Dermatology  Referral  - ESR: Erythrocyte sedimentation rate    Chronic upper back pain  -Was told by doctor back at home that she had a \"protrusion in her upper spine\", possibly herniate disc. Has upper back pain from this. Discussed working with PT to help with stretching and strengthening exercises.   -Follow-up in clinic if new or worsening symptoms.  - Physical Therapy  Referral              Counseling  Appropriate preventive services were addressed with this patient via screening, questionnaire, or discussion as appropriate for fall prevention, nutrition, physical activity, Tobacco-use cessation, weight loss and cognition.  Checklist reviewing preventive services available has been given to the patient.  Reviewed patient's diet, addressing concerns and/or questions.   She is at risk " for psychosocial distress and has been provided with information to reduce risk.           Subjective   Lisa is a 33 year old, presenting for the following:  Physical        7/31/2024     4:02 PM   Additional Questions   Roomed by uche   Accompanied by daughters and         Health Care Directive  Patient does not have a Health Care Directive or Living Will: Discussed advance care planning with patient; information given to patient to review.    HPI  Patient seen with language line intepreter.    Other concerns:  New rash that started 2-3 months ago. Small red spots scattered across body. Denies hematuria, joint pain, blood in stools, fever, chills.    Requesting long list of labs today. Was told she could get a full panel of labs at her preventive health visit.    Would like referral to gynecology.    Hx of upper back pain. Was told by doctor back at home that she has a protrusion in her spine that could cause paralysis if she was not careful. Wants to know what she can do to prevent future injury.              7/31/2024   General Health   How would you rate your overall physical health? Good   Feel stress (tense, anxious, or unable to sleep) Only a little      (!) STRESS CONCERN      7/31/2024   Nutrition   Three or more servings of calcium each day? (!) I DON'T KNOW   Diet: Regular (no restrictions)   How many servings of fruit and vegetables per day? (!) 0-1   How many sweetened beverages each day? 0-1            7/31/2024   Exercise   Days per week of moderate/strenous exercise 7 days            7/31/2024   Social Factors   Frequency of gathering with friends or relatives Once a week   Worry food won't last until get money to buy more No   Food not last or not have enough money for food? No   Do you have housing? (Housing is defined as stable permanent housing and does not include staying ouside in a car, in a tent, in an abandoned building, in an overnight shelter, or couch-surfing.) Yes   Are you worried  "about losing your housing? No   Lack of transportation? No   Unable to get utilities (heat,electricity)? No            7/31/2024   Dental   Dentist two times every year? Yes          Today's PHQ-2 Score:       7/31/2024     3:53 PM   PHQ-2 ( 1999 [employer])   Q1: Little interest or pleasure in doing things 0   Q2: Feeling down, depressed or hopeless 0   PHQ-2 Score 0   Q1: Little interest or pleasure in doing things Not at all   Q2: Feeling down, depressed or hopeless Not at all   PHQ-2 Score 0           7/31/2024   Substance Use   Alcohol more than 3/day or more than 7/wk Not Applicable   Do you use any other substances recreationally? No        Social History     Tobacco Use    Smoking status: Never    Smokeless tobacco: Never          Mammogram Screening - Patient under 40 years of age: Routine Mammogram Screening not recommended.         7/31/2024   STI Screening   New sexual partner(s) since last STI/HIV test? No        History of abnormal Pap smear: No - age 30- 64 PAP with HPV every 5 years recommended             7/31/2024   Contraception/Family Planning   Questions about contraception or family planning No           Reviewed and updated as needed this visit by Provider                             Objective    Exam  BP 99/65 (BP Location: Right arm, Patient Position: Sitting, Cuff Size: Adult Regular)   Pulse 66   Temp 97.9  F (36.6  C) (Temporal)   Resp 18   Ht 1.605 m (5' 3.19\")   Wt 46.5 kg (102 lb 9.6 oz)   SpO2 98%   Breastfeeding Yes   BMI 18.07 kg/m     Estimated body mass index is 18.07 kg/m  as calculated from the following:    Height as of this encounter: 1.605 m (5' 3.19\").    Weight as of this encounter: 46.5 kg (102 lb 9.6 oz).    Physical Exam  GENERAL: alert and no distress  EYES: Eyes grossly normal to inspection, PERRL and conjunctivae and sclerae normal  HENT:nose and mouth without ulcers or lesions  NECK: no adenopathy, no asymmetry, masses, or scars  RESP: lungs clear to auscultation " - no rales, rhonchi or wheezes  CV: regular rate and rhythm, normal S1 S2, no S3 or S4, no murmur, click or rub, no peripheral edema  ABDOMEN: soft, nontender, no hepatosplenomegaly, no masses and bowel sounds normal  MS: no gross musculoskeletal defects noted, no edema  SKIN: sparse, scattered petechia over arms, legs and torso.  NEURO: Normal strength and tone, mentation intact and speech normal  PSYCH: mentation appears normal, affect normal/bright        Signed Electronically by: Dhruv Burnett, DO

## 2024-08-01 LAB
ALBUMIN SERPL BCG-MCNC: 4.9 G/DL (ref 3.5–5.2)
ALP SERPL-CCNC: 73 U/L (ref 40–150)
ALT SERPL W P-5'-P-CCNC: 8 U/L (ref 0–50)
ANION GAP SERPL CALCULATED.3IONS-SCNC: 11 MMOL/L (ref 7–15)
AST SERPL W P-5'-P-CCNC: 19 U/L (ref 0–45)
BILIRUB SERPL-MCNC: 0.8 MG/DL
BUN SERPL-MCNC: 15.1 MG/DL (ref 6–20)
CALCIUM SERPL-MCNC: 9.7 MG/DL (ref 8.8–10.4)
CHLORIDE SERPL-SCNC: 103 MMOL/L (ref 98–107)
CHOLEST SERPL-MCNC: 208 MG/DL
CREAT SERPL-MCNC: 0.64 MG/DL (ref 0.51–0.95)
EGFRCR SERPLBLD CKD-EPI 2021: >90 ML/MIN/1.73M2
FASTING STATUS PATIENT QL REPORTED: YES
FASTING STATUS PATIENT QL REPORTED: YES
GLUCOSE SERPL-MCNC: 78 MG/DL (ref 70–99)
HCO3 SERPL-SCNC: 25 MMOL/L (ref 22–29)
HDLC SERPL-MCNC: 78 MG/DL
LDLC SERPL CALC-MCNC: 122 MG/DL
NONHDLC SERPL-MCNC: 130 MG/DL
POTASSIUM SERPL-SCNC: 4.6 MMOL/L (ref 3.4–5.3)
PROT SERPL-MCNC: 7.8 G/DL (ref 6.4–8.3)
SODIUM SERPL-SCNC: 139 MMOL/L (ref 135–145)
TRIGL SERPL-MCNC: 42 MG/DL
TSH SERPL DL<=0.005 MIU/L-ACNC: 1.9 UIU/ML (ref 0.3–4.2)
VIT D+METAB SERPL-MCNC: 49 NG/ML (ref 20–50)

## 2024-08-02 LAB
ANA SER QL IF: NEGATIVE
ANCA AB PATTERN SER IF-IMP: NORMAL
C-ANCA TITR SER IF: NORMAL {TITER}

## 2024-08-05 NOTE — ADDENDUM NOTE
Addended by: FRITZ GODFREY on: 8/5/2024 08:58 AM     Modules accepted: Level of Service    
CVA (cerebral vascular accident)

## 2024-08-06 LAB — SCHISTOSOMA IGG SER IA-ACNC: 8 U

## 2024-10-31 ENCOUNTER — OFFICE VISIT (OUTPATIENT)
Dept: URGENT CARE | Facility: URGENT CARE | Age: 34
End: 2024-10-31
Payer: COMMERCIAL

## 2024-10-31 VITALS
BODY MASS INDEX: 18.37 KG/M2 | WEIGHT: 110.23 LBS | HEIGHT: 65 IN | SYSTOLIC BLOOD PRESSURE: 108 MMHG | TEMPERATURE: 98.1 F | DIASTOLIC BLOOD PRESSURE: 70 MMHG | HEART RATE: 78 BPM

## 2024-10-31 DIAGNOSIS — N61.0 NIPPLE INFECTION: Primary | ICD-10-CM

## 2024-10-31 DIAGNOSIS — B37.9 CANDIDA INFECTION: ICD-10-CM

## 2024-10-31 PROCEDURE — 99213 OFFICE O/P EST LOW 20 MIN: CPT | Performed by: NURSE PRACTITIONER

## 2024-10-31 RX ORDER — ACETAMINOPHEN 325 MG/1
325-650 TABLET ORAL EVERY 6 HOURS PRN
COMMUNITY

## 2024-10-31 RX ORDER — DICLOXACILLIN SODIUM 500 MG/1
500 CAPSULE ORAL 4 TIMES DAILY
Qty: 40 CAPSULE | Refills: 0 | Status: SHIPPED | OUTPATIENT
Start: 2024-10-31 | End: 2024-11-10

## 2024-10-31 RX ORDER — MICONAZOLE NITRATE 20 MG/G
CREAM TOPICAL 2 TIMES DAILY
Qty: 113 G | Refills: 0 | Status: CANCELLED | OUTPATIENT
Start: 2024-10-31

## 2024-10-31 RX ORDER — NYSTATIN 100000 U/G
CREAM TOPICAL 2 TIMES DAILY
Qty: 30 G | Refills: 0 | Status: SHIPPED | OUTPATIENT
Start: 2024-10-31

## 2024-10-31 ASSESSMENT — ENCOUNTER SYMPTOMS
CARDIOVASCULAR NEGATIVE: 1
GASTROINTESTINAL NEGATIVE: 1
EYES NEGATIVE: 1
CONSTITUTIONAL NEGATIVE: 1
RESPIRATORY NEGATIVE: 1

## 2024-10-31 NOTE — PATIENT INSTRUCTIONS
Apply nystatin after breastfeeding. Wipe off nipple prior to breastfeeding again. Utilize for 10-14 days.    Antibiotic (dicloxacillin) 500 mg four times a day for 10 days.      -Leaving open to air is best, but cover as needed  -If develop a fever, increased redness, pain, drainage or swelling from the area, should follow up for reevaluation.  -Should see improvement in next 2-3 days after starting antibiotics/anti-fungal. Follow up if no improvement

## 2024-10-31 NOTE — PROGRESS NOTES
Spoke with patient today (11/04/2024) and she states the white spots on her breasts have resolved. She feels she is improving but her symptoms have not resolved. She will discontinue the nystatin and complete the dicloxacillin.     Assessment & Plan       ICD-10-CM    1. Nipple infection  N61.0 dicloxacillin (DYNAPEN) 500 MG capsule      2. Candida infection  B37.9 nystatin (MYCOSTATIN) 804146 UNIT/GM external cream           Patient Instructions   Apply nystatin after breastfeeding. Wipe off nipple prior to breastfeeding again.     Antibiotic (dicloxacillin) 500 mg four times a day for 10 days.      -Leaving open to air is best, unless area is rubbing or at risk for getting dirty.  -Tylenol or Ibuprofen as needed for discomfort  -If develop a fever, increased redness, pain, drainage or swelling from the area, should follow up for reevaluation.  -Should see improvement in next 2-3 days after starting antibiotics. Follow up if no improvement     Follow up with any problems, questions or concerns or if symptoms worsen or fail to improve. Patient agreed to plan and verbalized understanding.       Josue Gonzalez is a 34 year old female who presents to clinic today with her daughter for the following health issues:  Chief Complaint   Patient presents with    Urgent Care     Pain in breasts while breast feeding along with white spots on nipple.     HPI  Pain in breasts, left worse than right, with white spots on nipple for two days. She states the left breast has been more warm and tender as well. She denies any fevers (although she had a viral uri last week with a fever) in the last several days.       Review of Systems   Constitutional: Negative.    HENT: Negative.     Eyes: Negative.    Respiratory: Negative.     Cardiovascular: Negative.    Gastrointestinal: Negative.    Breasts:  Positive for tenderness.        Left breast has been more reddened with white spots around the nipple over the last couple of days.   "      Problem List:  There are no relevant problems documented for this patient.      History reviewed. No pertinent past medical history.      Social History     Tobacco Use    Smoking status: Never    Smokeless tobacco: Never   Substance Use Topics    Alcohol use: Not on file           Objective    /70   Pulse 78   Temp 98.1  F (36.7  C) (Temporal)   Ht 1.64 m (5' 4.57\")   Wt 50 kg (110 lb 3.7 oz)   Breastfeeding Yes   BMI 18.59 kg/m    Physical Exam  Vitals and nursing note reviewed.   Constitutional:       General: She is not in acute distress.     Appearance: Normal appearance.   HENT:      Head: Normocephalic and atraumatic.   Cardiovascular:      Rate and Rhythm: Normal rate and regular rhythm.      Heart sounds: Normal heart sounds.   Pulmonary:      Effort: Pulmonary effort is normal.      Breath sounds: Normal breath sounds.   Skin:     Comments: Left breast has erythema around the nipple with white spots on the nipple itself. A few scattered white spots on the right nipple, no erythema.   Neurological:      Mental Status: She is alert.              Mckenna Verduzco NP    "

## 2025-07-29 ENCOUNTER — OFFICE VISIT (OUTPATIENT)
Dept: URGENT CARE | Facility: URGENT CARE | Age: 35
End: 2025-07-29

## 2025-07-29 ENCOUNTER — ANCILLARY PROCEDURE (OUTPATIENT)
Dept: GENERAL RADIOLOGY | Facility: CLINIC | Age: 35
End: 2025-07-29
Attending: FAMILY MEDICINE
Payer: COMMERCIAL

## 2025-07-29 VITALS
TEMPERATURE: 98.6 F | HEIGHT: 62 IN | OXYGEN SATURATION: 100 % | SYSTOLIC BLOOD PRESSURE: 107 MMHG | DIASTOLIC BLOOD PRESSURE: 70 MMHG | HEART RATE: 66 BPM | BODY MASS INDEX: 21.16 KG/M2 | WEIGHT: 115 LBS | RESPIRATION RATE: 14 BRPM
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
## 2025-07-29 DIAGNOSIS — S99.911A ANKLE INJURY, RIGHT, INITIAL ENCOUNTER: ICD-10-CM

## 2025-07-29 DIAGNOSIS — M25.562 ACUTE PAIN OF LEFT KNEE: Primary | ICD-10-CM

## 2025-07-29 PROCEDURE — 73610 X-RAY EXAM OF ANKLE: CPT | Mod: TC | Performed by: RADIOLOGY

## 2025-07-29 PROCEDURE — 3078F DIAST BP <80 MM HG: CPT | Performed by: FAMILY MEDICINE

## 2025-07-29 PROCEDURE — 3074F SYST BP LT 130 MM HG: CPT | Performed by: FAMILY MEDICINE

## 2025-07-29 PROCEDURE — 99214 OFFICE O/P EST MOD 30 MIN: CPT | Performed by: FAMILY MEDICINE

## 2025-07-29 NOTE — PROGRESS NOTES
Chief Complaint   Patient presents with    Fall     Pt slipped on wet floor at work hurt knee, right ankle. Painful  Pt used an ice pack.     Lisa was seen today for fall.    Diagnoses and all orders for this visit:    Acute pain of left knee    Ankle injury, right, initial encounter  -     XR Ankle Right G/E 3 Views  -     Ankle/Foot Bracing Supplies Order Walking Boot; Right; Pneumatic; Tall  -     Orthopedic  Referral; Future    Work related injury        The rest of the foot, ankle and leg exam is normal.  Lateral malleolus there is a faint line at the distal end of the lateral malleolus which possibly is a fracture discussed with patient about the findings.  I did discuss with patient that, if the x-ray read by radiologist  did show any abnormality  Patient will be notified and treated accordingly   Discussed about the use of cam walker  was not able to connect with patientsent a detailed message to patient via [employer] [facility] tried calling patient    Ana M Brito MD     D/d  Ankle  sprain, contusion, fracture, dislocation, subluxation, tendon injury, and internal derangement    PLAN:  NSAID, ice suggested  activity modification  See orders in EpicCare.        SUBJECTIVE:  Lisa Cobb is a 34 year old female who complains of inversion injury to the right ankle 2 hours ago. Immediate symptoms: immediate pain, delayed pain, was able to bear weight directly after injury, also fell on the left knee . Symptoms have been gradual since that time. Prior history of related problems: no prior problems with this area in the past. There is pain and swelling at the lateral aspect of that ankle.     OBJECTIVE:  She appears well, vital signs are normal.   Right ankle -There is swelling and tenderness over the lateral malleolus and area anterior to it . No tenderness over the medial aspect of the ankle. The fifth metatarsal is not tender. The ankle joint is intact without excessive opening on stressing.  Left knee -no  patella tenderness noted, range of motion of the left knee is normal there is a 2 cm area of abrasion noted in the anterior aspect of the left knee.  No significant knee swelling noted.    An aM Brito MD

## 2025-07-29 NOTE — PROGRESS NOTES
[facility]gent [facility] Clinic Visit    Chief Complaint   Patient presents with    Fall     Pt slipped on wet floor at work hurt knee, right ankle. Painful  Pt used an ice pack.               7/29/2025    12:30 PM   Additional Questions   Roomed by Josephine   Accompanied by gary

## 2025-07-31 ENCOUNTER — MYC MEDICAL ADVICE (OUTPATIENT)
Dept: URGENT CARE | Facility: URGENT CARE | Age: 35
End: 2025-07-31
Payer: COMMERCIAL

## 2025-08-31 ENCOUNTER — HEALTH MAINTENANCE LETTER (OUTPATIENT)
Age: 35
End: 2025-08-31